# Patient Record
Sex: MALE | Race: BLACK OR AFRICAN AMERICAN | NOT HISPANIC OR LATINO | Employment: FULL TIME | URBAN - METROPOLITAN AREA
[De-identification: names, ages, dates, MRNs, and addresses within clinical notes are randomized per-mention and may not be internally consistent; named-entity substitution may affect disease eponyms.]

---

## 2018-10-24 ENCOUNTER — OCCUPATIONAL HEALTH (OUTPATIENT)
Dept: URGENT CARE | Facility: CLINIC | Age: 48
End: 2018-10-24

## 2018-10-24 PROCEDURE — 99499 UNLISTED E&M SERVICE: CPT | Mod: S$GLB,,, | Performed by: EMERGENCY MEDICINE

## 2019-09-11 ENCOUNTER — OCCUPATIONAL HEALTH (OUTPATIENT)
Dept: URGENT CARE | Facility: CLINIC | Age: 49
End: 2019-09-11

## 2019-09-11 DIAGNOSIS — Z02.83 ENCOUNTER FOR EMPLOYMENT-RELATED DRUG TESTING: ICD-10-CM

## 2019-09-11 PROCEDURE — 80305 DRUG TEST PRSMV DIR OPT OBS: CPT | Mod: S$GLB,,, | Performed by: EMERGENCY MEDICINE

## 2019-09-11 PROCEDURE — 82075 ASSAY OF BREATH ETHANOL: CPT | Mod: S$GLB,,, | Performed by: EMERGENCY MEDICINE

## 2019-09-11 PROCEDURE — 80305 PR COLLECTION ONLY DRUG SCREEN: ICD-10-PCS | Mod: S$GLB,,, | Performed by: EMERGENCY MEDICINE

## 2019-09-11 PROCEDURE — 82075 CHG ASSAY OF BREATH ETHANOL: ICD-10-PCS | Mod: S$GLB,,, | Performed by: EMERGENCY MEDICINE

## 2020-04-22 PROBLEM — J30.9 ALLERGIC RHINITIS: Status: ACTIVE | Noted: 2020-04-22

## 2020-07-28 DIAGNOSIS — R31.0 GROSS HEMATURIA: Primary | ICD-10-CM

## 2020-07-29 DIAGNOSIS — R31.0 GROSS HEMATURIA: Primary | ICD-10-CM

## 2020-08-03 ENCOUNTER — HOSPITAL ENCOUNTER (OUTPATIENT)
Dept: RADIOLOGY | Facility: HOSPITAL | Age: 50
Discharge: HOME | End: 2020-08-03
Attending: SPECIALIST
Payer: COMMERCIAL

## 2020-08-03 DIAGNOSIS — R31.0 GROSS HEMATURIA: ICD-10-CM

## 2020-08-03 PROCEDURE — 25500020 PHARM REV CODE 255: Mod: PO | Performed by: SPECIALIST

## 2020-08-03 PROCEDURE — 74178 CT ABD&PLV WO CNTR FLWD CNTR: CPT | Mod: TC,PO

## 2020-08-03 RX ADMIN — IOHEXOL 100 ML: 350 INJECTION, SOLUTION INTRAVENOUS at 02:08

## 2020-10-19 ENCOUNTER — OCCUPATIONAL HEALTH (OUTPATIENT)
Dept: URGENT CARE | Facility: CLINIC | Age: 50
End: 2020-10-19

## 2020-10-19 PROCEDURE — 99499 PR PHYSICAL - DOT/CDL: ICD-10-PCS | Mod: S$GLB,,, | Performed by: NURSE PRACTITIONER

## 2020-10-19 PROCEDURE — 99499 UNLISTED E&M SERVICE: CPT | Mod: S$GLB,,, | Performed by: NURSE PRACTITIONER

## 2021-02-25 ENCOUNTER — OCCUPATIONAL HEALTH (OUTPATIENT)
Dept: URGENT CARE | Facility: CLINIC | Age: 51
End: 2021-02-25
Payer: COMMERCIAL

## 2021-02-25 PROCEDURE — 80305 DRUG TEST PRSMV DIR OPT OBS: CPT | Mod: S$GLB,,, | Performed by: EMERGENCY MEDICINE

## 2021-02-25 PROCEDURE — 80305 PR COLLECTION ONLY DRUG SCREEN: ICD-10-PCS | Mod: S$GLB,,, | Performed by: EMERGENCY MEDICINE

## 2021-03-31 ENCOUNTER — OCCUPATIONAL HEALTH (OUTPATIENT)
Dept: URGENT CARE | Facility: CLINIC | Age: 51
End: 2021-03-31
Payer: COMMERCIAL

## 2021-03-31 PROCEDURE — 80305 PR COLLECTION ONLY DRUG SCREEN: ICD-10-PCS | Mod: S$GLB,,, | Performed by: EMERGENCY MEDICINE

## 2021-03-31 PROCEDURE — 82075 ASSAY OF BREATH ETHANOL: CPT | Mod: S$GLB,,, | Performed by: EMERGENCY MEDICINE

## 2021-03-31 PROCEDURE — 80305 DRUG TEST PRSMV DIR OPT OBS: CPT | Mod: S$GLB,,, | Performed by: EMERGENCY MEDICINE

## 2021-03-31 PROCEDURE — 82075 CHG ASSAY OF BREATH ETHANOL: ICD-10-PCS | Mod: S$GLB,,, | Performed by: EMERGENCY MEDICINE

## 2021-07-08 DIAGNOSIS — Z00.00 ROUTINE GENERAL MEDICAL EXAMINATION AT A HEALTH CARE FACILITY: Primary | ICD-10-CM

## 2021-07-08 DIAGNOSIS — Z12.5 SCREENING FOR PROSTATE CANCER: ICD-10-CM

## 2021-08-13 ENCOUNTER — TELEPHONE (OUTPATIENT)
Dept: FAMILY MEDICINE | Facility: CLINIC | Age: 51
End: 2021-08-13

## 2021-08-23 ENCOUNTER — OFFICE VISIT (OUTPATIENT)
Dept: FAMILY MEDICINE | Facility: CLINIC | Age: 51
End: 2021-08-23
Payer: COMMERCIAL

## 2021-08-23 VITALS
TEMPERATURE: 98 F | WEIGHT: 263 LBS | HEART RATE: 64 BPM | DIASTOLIC BLOOD PRESSURE: 84 MMHG | HEIGHT: 74 IN | OXYGEN SATURATION: 98 % | BODY MASS INDEX: 33.75 KG/M2 | SYSTOLIC BLOOD PRESSURE: 134 MMHG

## 2021-08-23 DIAGNOSIS — Z00.00 PHYSICAL EXAM: Primary | ICD-10-CM

## 2021-08-23 DIAGNOSIS — D49.2 NEOPLASM OF SKIN OF LOWER LEG: ICD-10-CM

## 2021-08-23 PROCEDURE — 1160F PR REVIEW ALL MEDS BY PRESCRIBER/CLIN PHARMACIST DOCUMENTED: ICD-10-PCS | Mod: CPTII,S$GLB,, | Performed by: FAMILY MEDICINE

## 2021-08-23 PROCEDURE — 90472 ZOSTER RECOMBINANT VACCINE: ICD-10-PCS | Mod: S$GLB,,, | Performed by: FAMILY MEDICINE

## 2021-08-23 PROCEDURE — 1159F MED LIST DOCD IN RCRD: CPT | Mod: CPTII,S$GLB,, | Performed by: FAMILY MEDICINE

## 2021-08-23 PROCEDURE — 99386 PR PREVENTIVE VISIT,NEW,40-64: ICD-10-PCS | Mod: 25,S$GLB,, | Performed by: FAMILY MEDICINE

## 2021-08-23 PROCEDURE — 3008F BODY MASS INDEX DOCD: CPT | Mod: CPTII,S$GLB,, | Performed by: FAMILY MEDICINE

## 2021-08-23 PROCEDURE — 90471 IMMUNIZATION ADMIN: CPT | Mod: S$GLB,,, | Performed by: FAMILY MEDICINE

## 2021-08-23 PROCEDURE — 90750 ZOSTER RECOMBINANT VACCINE: ICD-10-PCS | Mod: S$GLB,,, | Performed by: FAMILY MEDICINE

## 2021-08-23 PROCEDURE — 3075F PR MOST RECENT SYSTOLIC BLOOD PRESS GE 130-139MM HG: ICD-10-PCS | Mod: CPTII,S$GLB,, | Performed by: FAMILY MEDICINE

## 2021-08-23 PROCEDURE — 90750 HZV VACC RECOMBINANT IM: CPT | Mod: S$GLB,,, | Performed by: FAMILY MEDICINE

## 2021-08-23 PROCEDURE — 90715 TDAP VACCINE GREATER THAN OR EQUAL TO 7YO IM: ICD-10-PCS | Mod: S$GLB,,, | Performed by: FAMILY MEDICINE

## 2021-08-23 PROCEDURE — 90472 IMMUNIZATION ADMIN EACH ADD: CPT | Mod: S$GLB,,, | Performed by: FAMILY MEDICINE

## 2021-08-23 PROCEDURE — 3075F SYST BP GE 130 - 139MM HG: CPT | Mod: CPTII,S$GLB,, | Performed by: FAMILY MEDICINE

## 2021-08-23 PROCEDURE — 1159F PR MEDICATION LIST DOCUMENTED IN MEDICAL RECORD: ICD-10-PCS | Mod: CPTII,S$GLB,, | Performed by: FAMILY MEDICINE

## 2021-08-23 PROCEDURE — 90471 TDAP VACCINE GREATER THAN OR EQUAL TO 7YO IM: ICD-10-PCS | Mod: S$GLB,,, | Performed by: FAMILY MEDICINE

## 2021-08-23 PROCEDURE — 3008F PR BODY MASS INDEX (BMI) DOCUMENTED: ICD-10-PCS | Mod: CPTII,S$GLB,, | Performed by: FAMILY MEDICINE

## 2021-08-23 PROCEDURE — 3079F DIAST BP 80-89 MM HG: CPT | Mod: CPTII,S$GLB,, | Performed by: FAMILY MEDICINE

## 2021-08-23 PROCEDURE — 1160F RVW MEDS BY RX/DR IN RCRD: CPT | Mod: CPTII,S$GLB,, | Performed by: FAMILY MEDICINE

## 2021-08-23 PROCEDURE — 3079F PR MOST RECENT DIASTOLIC BLOOD PRESSURE 80-89 MM HG: ICD-10-PCS | Mod: CPTII,S$GLB,, | Performed by: FAMILY MEDICINE

## 2021-08-23 PROCEDURE — 99386 PREV VISIT NEW AGE 40-64: CPT | Mod: 25,S$GLB,, | Performed by: FAMILY MEDICINE

## 2021-08-23 PROCEDURE — 90715 TDAP VACCINE 7 YRS/> IM: CPT | Mod: S$GLB,,, | Performed by: FAMILY MEDICINE

## 2021-08-27 LAB
ALBUMIN SERPL-MCNC: 4.4 G/DL (ref 3.6–5.1)
ALBUMIN/GLOB SERPL: 1.8 (CALC) (ref 1–2.5)
ALP SERPL-CCNC: 48 U/L (ref 35–144)
ALT SERPL-CCNC: 40 U/L (ref 9–46)
AST SERPL-CCNC: 44 U/L (ref 10–35)
BASOPHILS # BLD AUTO: 18 CELLS/UL (ref 0–200)
BASOPHILS NFR BLD AUTO: 0.4 %
BILIRUB SERPL-MCNC: 0.8 MG/DL (ref 0.2–1.2)
BUN SERPL-MCNC: 12 MG/DL (ref 7–25)
BUN/CREAT SERPL: ABNORMAL (CALC) (ref 6–22)
CALCIUM SERPL-MCNC: 9.4 MG/DL (ref 8.6–10.3)
CHLORIDE SERPL-SCNC: 105 MMOL/L (ref 98–110)
CHOLEST SERPL-MCNC: 140 MG/DL
CHOLEST/HDLC SERPL: 2.9 (CALC)
CO2 SERPL-SCNC: 27 MMOL/L (ref 20–32)
CREAT SERPL-MCNC: 0.85 MG/DL (ref 0.7–1.33)
EOSINOPHIL # BLD AUTO: 131 CELLS/UL (ref 15–500)
EOSINOPHIL NFR BLD AUTO: 2.9 %
ERYTHROCYTE [DISTWIDTH] IN BLOOD BY AUTOMATED COUNT: 14.3 % (ref 11–15)
GLOBULIN SER CALC-MCNC: 2.5 G/DL (CALC) (ref 1.9–3.7)
GLUCOSE SERPL-MCNC: 112 MG/DL (ref 65–99)
HCT VFR BLD AUTO: 38.5 % (ref 38.5–50)
HCV AB S/CO SERPL IA: 0.01
HCV AB SERPL QL IA: NORMAL
HDLC SERPL-MCNC: 48 MG/DL
HGB BLD-MCNC: 12.4 G/DL (ref 13.2–17.1)
HIV 1+2 AB+HIV1 P24 AG SERPL QL IA: NORMAL
LDLC SERPL CALC-MCNC: 77 MG/DL (CALC)
LYMPHOCYTES # BLD AUTO: 1530 CELLS/UL (ref 850–3900)
LYMPHOCYTES NFR BLD AUTO: 34 %
MCH RBC QN AUTO: 24 PG (ref 27–33)
MCHC RBC AUTO-ENTMCNC: 32.2 G/DL (ref 32–36)
MCV RBC AUTO: 74.6 FL (ref 80–100)
MONOCYTES # BLD AUTO: 369 CELLS/UL (ref 200–950)
MONOCYTES NFR BLD AUTO: 8.2 %
NEUTROPHILS # BLD AUTO: 2453 CELLS/UL (ref 1500–7800)
NEUTROPHILS NFR BLD AUTO: 54.5 %
NONHDLC SERPL-MCNC: 92 MG/DL (CALC)
PLATELET # BLD AUTO: 130 THOUSAND/UL (ref 140–400)
PMV BLD REES-ECKER: 12.7 FL (ref 7.5–12.5)
POTASSIUM SERPL-SCNC: 4 MMOL/L (ref 3.5–5.3)
PROT SERPL-MCNC: 6.9 G/DL (ref 6.1–8.1)
RBC # BLD AUTO: 5.16 MILLION/UL (ref 4.2–5.8)
SODIUM SERPL-SCNC: 139 MMOL/L (ref 135–146)
TRIGL SERPL-MCNC: 69 MG/DL
TSH SERPL-ACNC: 2.77 MIU/L (ref 0.4–4.5)
WBC # BLD AUTO: 4.5 THOUSAND/UL (ref 3.8–10.8)

## 2021-09-08 ENCOUNTER — TELEPHONE (OUTPATIENT)
Dept: FAMILY MEDICINE | Facility: CLINIC | Age: 51
End: 2021-09-08

## 2021-09-11 PROBLEM — D49.2 NEOPLASM OF SKIN OF LOWER LEG: Status: ACTIVE | Noted: 2021-09-11

## 2021-09-29 ENCOUNTER — OFFICE VISIT (OUTPATIENT)
Dept: DERMATOLOGY | Facility: CLINIC | Age: 51
End: 2021-09-29
Payer: COMMERCIAL

## 2021-09-29 DIAGNOSIS — L43.9 LICHEN PLANUS: ICD-10-CM

## 2021-09-29 PROCEDURE — 1160F RVW MEDS BY RX/DR IN RCRD: CPT | Mod: CPTII,S$GLB,, | Performed by: STUDENT IN AN ORGANIZED HEALTH CARE EDUCATION/TRAINING PROGRAM

## 2021-09-29 PROCEDURE — 99999 PR PBB SHADOW E&M-EST. PATIENT-LVL II: CPT | Mod: PBBFAC,,, | Performed by: STUDENT IN AN ORGANIZED HEALTH CARE EDUCATION/TRAINING PROGRAM

## 2021-09-29 PROCEDURE — 99203 OFFICE O/P NEW LOW 30 MIN: CPT | Mod: 25,S$GLB,, | Performed by: STUDENT IN AN ORGANIZED HEALTH CARE EDUCATION/TRAINING PROGRAM

## 2021-09-29 PROCEDURE — 11900 INJECT SKIN LESIONS </W 7: CPT | Mod: S$GLB,,, | Performed by: STUDENT IN AN ORGANIZED HEALTH CARE EDUCATION/TRAINING PROGRAM

## 2021-09-29 PROCEDURE — 1160F PR REVIEW ALL MEDS BY PRESCRIBER/CLIN PHARMACIST DOCUMENTED: ICD-10-PCS | Mod: CPTII,S$GLB,, | Performed by: STUDENT IN AN ORGANIZED HEALTH CARE EDUCATION/TRAINING PROGRAM

## 2021-09-29 PROCEDURE — 1159F MED LIST DOCD IN RCRD: CPT | Mod: CPTII,S$GLB,, | Performed by: STUDENT IN AN ORGANIZED HEALTH CARE EDUCATION/TRAINING PROGRAM

## 2021-09-29 PROCEDURE — 11900 PR INJECTION INTO SKIN LESIONS, UP TO 7: ICD-10-PCS | Mod: S$GLB,,, | Performed by: STUDENT IN AN ORGANIZED HEALTH CARE EDUCATION/TRAINING PROGRAM

## 2021-09-29 PROCEDURE — 1159F PR MEDICATION LIST DOCUMENTED IN MEDICAL RECORD: ICD-10-PCS | Mod: CPTII,S$GLB,, | Performed by: STUDENT IN AN ORGANIZED HEALTH CARE EDUCATION/TRAINING PROGRAM

## 2021-09-29 PROCEDURE — 99999 PR PBB SHADOW E&M-EST. PATIENT-LVL II: ICD-10-PCS | Mod: PBBFAC,,, | Performed by: STUDENT IN AN ORGANIZED HEALTH CARE EDUCATION/TRAINING PROGRAM

## 2021-09-29 PROCEDURE — 99203 PR OFFICE/OUTPT VISIT, NEW, LEVL III, 30-44 MIN: ICD-10-PCS | Mod: 25,S$GLB,, | Performed by: STUDENT IN AN ORGANIZED HEALTH CARE EDUCATION/TRAINING PROGRAM

## 2021-09-29 RX ORDER — CLOBETASOL PROPIONATE 0.5 MG/G
OINTMENT TOPICAL 2 TIMES DAILY
Qty: 60 G | Refills: 2 | Status: SHIPPED | OUTPATIENT
Start: 2021-09-29 | End: 2022-07-14 | Stop reason: SDUPTHER

## 2022-06-10 ENCOUNTER — TELEPHONE (OUTPATIENT)
Dept: FAMILY MEDICINE | Facility: CLINIC | Age: 52
End: 2022-06-10
Payer: COMMERCIAL

## 2022-06-10 DIAGNOSIS — Z00.00 PHYSICAL EXAM: Primary | ICD-10-CM

## 2022-06-10 NOTE — TELEPHONE ENCOUNTER
----- Message from Latha Clemente sent at 6/10/2022 12:51 PM CDT -----  Contact: pt  Type: Needs Lab Orders     Who Called: pt  Best Call Back Number: [phone]    Inquiry/Question: pt has appt for July 14, 2022 and would like his orders put in so he can get them done on the 12th or 13th. 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 Please call pt when done    .  Please place order in system.     Thank you~         DONE

## 2022-07-12 LAB
ALBUMIN SERPL-MCNC: 4.6 G/DL (ref 3.6–5.1)
ALBUMIN/GLOB SERPL: 1.9 (CALC) (ref 1–2.5)
ALP SERPL-CCNC: 50 U/L (ref 35–144)
ALT SERPL-CCNC: 48 U/L (ref 9–46)
AST SERPL-CCNC: 49 U/L (ref 10–35)
BASOPHILS # BLD AUTO: 8 CELLS/UL (ref 0–200)
BASOPHILS NFR BLD AUTO: 0.2 %
BILIRUB SERPL-MCNC: 0.7 MG/DL (ref 0.2–1.2)
BUN SERPL-MCNC: 13 MG/DL (ref 7–25)
BUN/CREAT SERPL: ABNORMAL (CALC) (ref 6–22)
CALCIUM SERPL-MCNC: 9.4 MG/DL (ref 8.6–10.3)
CHLORIDE SERPL-SCNC: 108 MMOL/L (ref 98–110)
CHOLEST SERPL-MCNC: 138 MG/DL
CHOLEST/HDLC SERPL: 2.7 (CALC)
CO2 SERPL-SCNC: 27 MMOL/L (ref 20–32)
CREAT SERPL-MCNC: 0.9 MG/DL (ref 0.7–1.3)
EGFR: 102 ML/MIN/1.73M2
EOSINOPHIL # BLD AUTO: 111 CELLS/UL (ref 15–500)
EOSINOPHIL NFR BLD AUTO: 2.7 %
ERYTHROCYTE [DISTWIDTH] IN BLOOD BY AUTOMATED COUNT: 14.5 % (ref 11–15)
GLOBULIN SER CALC-MCNC: 2.4 G/DL (CALC) (ref 1.9–3.7)
GLUCOSE SERPL-MCNC: 96 MG/DL (ref 65–99)
HCT VFR BLD AUTO: 41.7 % (ref 38.5–50)
HDLC SERPL-MCNC: 51 MG/DL
HGB BLD-MCNC: 12.9 G/DL (ref 13.2–17.1)
LDLC SERPL CALC-MCNC: 73 MG/DL (CALC)
LYMPHOCYTES # BLD AUTO: 1677 CELLS/UL (ref 850–3900)
LYMPHOCYTES NFR BLD AUTO: 40.9 %
MCH RBC QN AUTO: 23.7 PG (ref 27–33)
MCHC RBC AUTO-ENTMCNC: 30.9 G/DL (ref 32–36)
MCV RBC AUTO: 76.7 FL (ref 80–100)
MONOCYTES # BLD AUTO: 262 CELLS/UL (ref 200–950)
MONOCYTES NFR BLD AUTO: 6.4 %
NEUTROPHILS # BLD AUTO: 2042 CELLS/UL (ref 1500–7800)
NEUTROPHILS NFR BLD AUTO: 49.8 %
NONHDLC SERPL-MCNC: 87 MG/DL (CALC)
PLATELET # BLD AUTO: 145 THOUSAND/UL (ref 140–400)
PMV BLD REES-ECKER: 12.5 FL (ref 7.5–12.5)
POTASSIUM SERPL-SCNC: 4.2 MMOL/L (ref 3.5–5.3)
PROT SERPL-MCNC: 7 G/DL (ref 6.1–8.1)
PSA SERPL-MCNC: 0.22 NG/ML
RBC # BLD AUTO: 5.44 MILLION/UL (ref 4.2–5.8)
SODIUM SERPL-SCNC: 143 MMOL/L (ref 135–146)
TRIGL SERPL-MCNC: 62 MG/DL
WBC # BLD AUTO: 4.1 THOUSAND/UL (ref 3.8–10.8)

## 2022-07-14 ENCOUNTER — OFFICE VISIT (OUTPATIENT)
Dept: FAMILY MEDICINE | Facility: CLINIC | Age: 52
End: 2022-07-14
Payer: COMMERCIAL

## 2022-07-14 ENCOUNTER — PATIENT OUTREACH (OUTPATIENT)
Dept: ADMINISTRATIVE | Facility: HOSPITAL | Age: 52
End: 2022-07-14
Payer: COMMERCIAL

## 2022-07-14 VITALS
OXYGEN SATURATION: 95 % | HEIGHT: 74 IN | HEART RATE: 67 BPM | SYSTOLIC BLOOD PRESSURE: 126 MMHG | WEIGHT: 264.88 LBS | TEMPERATURE: 98 F | DIASTOLIC BLOOD PRESSURE: 84 MMHG | BODY MASS INDEX: 33.99 KG/M2

## 2022-07-14 DIAGNOSIS — J30.9 ALLERGIC RHINITIS, UNSPECIFIED SEASONALITY, UNSPECIFIED TRIGGER: ICD-10-CM

## 2022-07-14 DIAGNOSIS — Z00.00 PHYSICAL EXAM: Primary | ICD-10-CM

## 2022-07-14 DIAGNOSIS — L43.9 LICHEN PLANUS: ICD-10-CM

## 2022-07-14 PROCEDURE — 90471 ZOSTER RECOMBINANT VACCINE: ICD-10-PCS | Mod: S$GLB,,, | Performed by: FAMILY MEDICINE

## 2022-07-14 PROCEDURE — 99396 PREV VISIT EST AGE 40-64: CPT | Mod: 25,S$GLB,, | Performed by: FAMILY MEDICINE

## 2022-07-14 PROCEDURE — 3074F PR MOST RECENT SYSTOLIC BLOOD PRESSURE < 130 MM HG: ICD-10-PCS | Mod: CPTII,S$GLB,, | Performed by: FAMILY MEDICINE

## 2022-07-14 PROCEDURE — 3079F DIAST BP 80-89 MM HG: CPT | Mod: CPTII,S$GLB,, | Performed by: FAMILY MEDICINE

## 2022-07-14 PROCEDURE — 99396 PR PREVENTIVE VISIT,EST,40-64: ICD-10-PCS | Mod: 25,S$GLB,, | Performed by: FAMILY MEDICINE

## 2022-07-14 PROCEDURE — 90750 HZV VACC RECOMBINANT IM: CPT | Mod: S$GLB,,, | Performed by: FAMILY MEDICINE

## 2022-07-14 PROCEDURE — 1159F PR MEDICATION LIST DOCUMENTED IN MEDICAL RECORD: ICD-10-PCS | Mod: CPTII,S$GLB,, | Performed by: FAMILY MEDICINE

## 2022-07-14 PROCEDURE — 1160F RVW MEDS BY RX/DR IN RCRD: CPT | Mod: CPTII,S$GLB,, | Performed by: FAMILY MEDICINE

## 2022-07-14 PROCEDURE — 1160F PR REVIEW ALL MEDS BY PRESCRIBER/CLIN PHARMACIST DOCUMENTED: ICD-10-PCS | Mod: CPTII,S$GLB,, | Performed by: FAMILY MEDICINE

## 2022-07-14 PROCEDURE — 3074F SYST BP LT 130 MM HG: CPT | Mod: CPTII,S$GLB,, | Performed by: FAMILY MEDICINE

## 2022-07-14 PROCEDURE — 1159F MED LIST DOCD IN RCRD: CPT | Mod: CPTII,S$GLB,, | Performed by: FAMILY MEDICINE

## 2022-07-14 PROCEDURE — 90471 IMMUNIZATION ADMIN: CPT | Mod: S$GLB,,, | Performed by: FAMILY MEDICINE

## 2022-07-14 PROCEDURE — 3008F BODY MASS INDEX DOCD: CPT | Mod: CPTII,S$GLB,, | Performed by: FAMILY MEDICINE

## 2022-07-14 PROCEDURE — 90750 ZOSTER RECOMBINANT VACCINE: ICD-10-PCS | Mod: S$GLB,,, | Performed by: FAMILY MEDICINE

## 2022-07-14 PROCEDURE — 3079F PR MOST RECENT DIASTOLIC BLOOD PRESSURE 80-89 MM HG: ICD-10-PCS | Mod: CPTII,S$GLB,, | Performed by: FAMILY MEDICINE

## 2022-07-14 PROCEDURE — 3008F PR BODY MASS INDEX (BMI) DOCUMENTED: ICD-10-PCS | Mod: CPTII,S$GLB,, | Performed by: FAMILY MEDICINE

## 2022-07-14 RX ORDER — CLOBETASOL PROPIONATE 0.5 MG/G
OINTMENT TOPICAL 2 TIMES DAILY
Qty: 60 G | Refills: 2 | Status: SHIPPED | OUTPATIENT
Start: 2022-07-14

## 2022-07-14 RX ORDER — IBUPROFEN 100 MG/5ML
1000 SUSPENSION, ORAL (FINAL DOSE FORM) ORAL DAILY
COMMUNITY

## 2022-07-14 RX ORDER — CYCLOBENZAPRINE HCL 10 MG
10 TABLET ORAL EVERY 8 HOURS
COMMUNITY
Start: 2022-02-15

## 2022-07-14 NOTE — LETTER
AUTHORIZATION FOR RELEASE OF   CONFIDENTIAL INFORMATION    DR PARTIDA    We are seeing Delfin Mohr, date of birth 1970, in the clinic at [facility]. Dinh Urias III, MD is the patient's PCP. Delfin Mohr has an outstanding lab/procedure at the time we reviewed his chart. In order to help keep his health information updated, he has authorized us to request the following medical record(s):        (  )  MAMMOGRAM                                      ( X  COLONOSCOPY      (  )  PAP SMEAR                                          (  )  OUTSIDE LAB RESULTS     (  )  DEXA SCAN                                          (  )  EYE EXAM            (  )  FOOT EXAM                                          (  )  ENTIRE RECORD     (  )  OUTSIDE IMMUNIZATIONS                 (  )  _______________         Please fax records to [facility], Clinton H Sharp III, MD, [phone]    Thank you in advance,        Mila ZHONG  Care Coordinator  [facility] [facility] [address] [place] [address]  Phone [phone]  Fax [phone]          Patient Name: Delfin Mohr  : 1970  Patient Phone #: [phone]

## 2022-07-14 NOTE — PROGRESS NOTES
Subjective:       Patient ID: Delfin Mohr is a 52 y.o. male.    Chief Complaint: Annual Exam and Rash (On leg and pain)    HPI   Patient presents to clinic for annual exam. Rash 1-1 1/2 years. Lichen planus on lower extremities. They are not painful. Some itchiness. Went to dermatologist. Was given an injection in office. Using Temovate cream. Needs refill on this. Still . No tobacco or alcohol use. No exercise. No changes in family history. No recent surgeries. Had lab work drawn. Will call for results. Previous lab work was very good. Started taking Vit C and B12 OTC. Some sinus issues. Was going to have surgery but declined at the time due to high deductible. No wheezing. Due for second shingles shot. Colonoscopy up to date. Has follow up next month. Neoplasm of uncertain behavior of bladder. No trouble urinating. Some nocturia. States his urine is dark at times. Current follow up with urologist.   Review of Systems   Constitutional: Negative.    HENT: Negative.    Eyes: Negative.    Respiratory: Negative.    Cardiovascular: Negative.  Negative for chest pain and palpitations.   Gastrointestinal: Negative.    Endocrine: Negative.    Genitourinary:        Nocturia   Musculoskeletal: Negative.    Integumentary:  Positive for rash.        Lichen planus    Allergic/Immunologic: Positive for environmental allergies.   Neurological: Negative.    Hematological: Negative.    Psychiatric/Behavioral: Negative.          Objective:      Physical Exam  Constitutional:       Appearance: Normal appearance.   Neck:      Vascular: No carotid bruit.   Cardiovascular:      Rate and Rhythm: Normal rate and regular rhythm.      Pulses: Normal pulses.      Heart sounds: Normal heart sounds.   Pulmonary:      Effort: Pulmonary effort is normal.      Breath sounds: Normal breath sounds.   Abdominal:      General: Abdomen is flat.      Palpations: Abdomen is soft.   Musculoskeletal:         General: Normal range of  motion.   Lymphadenopathy:      Cervical: No cervical adenopathy.   Skin:     General: Skin is warm and dry.   Neurological:      Mental Status: He is alert.   Psychiatric:         Mood and Affect: Mood normal.         Behavior: Behavior normal.         Assessment/Plan:     Physical exam    Lichen planus  -     clobetasol 0.05% (TEMOVATE) 0.05 % Oint; Apply topically 2 (two) times daily.  Dispense: 60 g; Refill: 2    Allergic rhinitis, unspecified seasonality, unspecified trigger    Other orders  -     Zoster Recombinant Vaccine       Second rules vaccine dose.  Get lab results from [facility] done 4 days ago somehow not showing in the system.  Had CBC CMP lipids and PSA.  Colonoscopy result from Dr. Bowman.  Need to copy of this refill his Temovate cream.

## 2022-07-19 ENCOUNTER — TELEPHONE (OUTPATIENT)
Dept: FAMILY MEDICINE | Facility: CLINIC | Age: 52
End: 2022-07-19
Payer: COMMERCIAL

## 2022-07-19 NOTE — TELEPHONE ENCOUNTER
----- Message from Marshall Ramesh sent at 7/19/2022 11:43 AM CDT -----  Type: Needs Medical Advice  Who Called:  Pt    Best Call Back Number: [phone] )     Additional Information: Pt is confused on his lab results and would like a call back from someone to explain them to him.  Please advise- - Thank you

## 2022-10-07 ENCOUNTER — OCCUPATIONAL HEALTH (OUTPATIENT)
Dept: URGENT CARE | Facility: CLINIC | Age: 52
End: 2022-10-07

## 2022-10-07 DIAGNOSIS — Z00.00 ENCOUNTER FOR PHYSICAL EXAMINATION: Primary | ICD-10-CM

## 2022-10-07 LAB — COLLECTION ONLY: NORMAL

## 2022-10-07 PROCEDURE — 99499 DOT PHYSICAL: ICD-10-PCS | Mod: S$GLB,,, | Performed by: NURSE PRACTITIONER

## 2022-10-07 PROCEDURE — 99499 UNLISTED E&M SERVICE: CPT | Mod: S$GLB,,, | Performed by: NURSE PRACTITIONER

## 2023-01-26 ENCOUNTER — OCCUPATIONAL HEALTH (OUTPATIENT)
Dept: URGENT CARE | Facility: CLINIC | Age: 53
End: 2023-01-26
Payer: COMMERCIAL

## 2023-01-26 DIAGNOSIS — Z13.9 ENCOUNTER FOR SCREENING: Primary | ICD-10-CM

## 2023-01-26 LAB — COLLECTION ONLY: NORMAL

## 2023-01-26 PROCEDURE — 80305 DRUG TEST PRSMV DIR OPT OBS: CPT | Mod: S$GLB,,, | Performed by: EMERGENCY MEDICINE

## 2023-01-26 PROCEDURE — 80305 OOH COLLECTION ONLY DRUG SCREEN: ICD-10-PCS | Mod: S$GLB,,, | Performed by: EMERGENCY MEDICINE

## 2023-04-10 ENCOUNTER — TELEPHONE (OUTPATIENT)
Dept: FAMILY MEDICINE | Facility: CLINIC | Age: 53
End: 2023-04-10
Payer: COMMERCIAL

## 2023-04-10 ENCOUNTER — OFFICE VISIT (OUTPATIENT)
Dept: FAMILY MEDICINE | Facility: CLINIC | Age: 53
End: 2023-04-10
Payer: COMMERCIAL

## 2023-04-10 VITALS
OXYGEN SATURATION: 100 % | HEIGHT: 74 IN | DIASTOLIC BLOOD PRESSURE: 82 MMHG | BODY MASS INDEX: 32.9 KG/M2 | HEART RATE: 99 BPM | WEIGHT: 256.38 LBS | TEMPERATURE: 99 F | SYSTOLIC BLOOD PRESSURE: 126 MMHG

## 2023-04-10 DIAGNOSIS — Z13.1 SCREENING FOR DIABETES MELLITUS (DM): ICD-10-CM

## 2023-04-10 DIAGNOSIS — L50.9 URTICARIA: Primary | ICD-10-CM

## 2023-04-10 PROCEDURE — 99213 PR OFFICE/OUTPT VISIT, EST, LEVL III, 20-29 MIN: ICD-10-PCS | Mod: S$GLB,,, | Performed by: FAMILY MEDICINE

## 2023-04-10 PROCEDURE — 1159F PR MEDICATION LIST DOCUMENTED IN MEDICAL RECORD: ICD-10-PCS | Mod: CPTII,S$GLB,, | Performed by: FAMILY MEDICINE

## 2023-04-10 PROCEDURE — 99213 OFFICE O/P EST LOW 20 MIN: CPT | Mod: S$GLB,,, | Performed by: FAMILY MEDICINE

## 2023-04-10 PROCEDURE — 1159F MED LIST DOCD IN RCRD: CPT | Mod: CPTII,S$GLB,, | Performed by: FAMILY MEDICINE

## 2023-04-10 PROCEDURE — 3074F PR MOST RECENT SYSTOLIC BLOOD PRESSURE < 130 MM HG: ICD-10-PCS | Mod: CPTII,S$GLB,, | Performed by: FAMILY MEDICINE

## 2023-04-10 PROCEDURE — 1160F PR REVIEW ALL MEDS BY PRESCRIBER/CLIN PHARMACIST DOCUMENTED: ICD-10-PCS | Mod: CPTII,S$GLB,, | Performed by: FAMILY MEDICINE

## 2023-04-10 PROCEDURE — 3079F PR MOST RECENT DIASTOLIC BLOOD PRESSURE 80-89 MM HG: ICD-10-PCS | Mod: CPTII,S$GLB,, | Performed by: FAMILY MEDICINE

## 2023-04-10 PROCEDURE — 3074F SYST BP LT 130 MM HG: CPT | Mod: CPTII,S$GLB,, | Performed by: FAMILY MEDICINE

## 2023-04-10 PROCEDURE — 3008F BODY MASS INDEX DOCD: CPT | Mod: CPTII,S$GLB,, | Performed by: FAMILY MEDICINE

## 2023-04-10 PROCEDURE — 3079F DIAST BP 80-89 MM HG: CPT | Mod: CPTII,S$GLB,, | Performed by: FAMILY MEDICINE

## 2023-04-10 PROCEDURE — 3008F PR BODY MASS INDEX (BMI) DOCUMENTED: ICD-10-PCS | Mod: CPTII,S$GLB,, | Performed by: FAMILY MEDICINE

## 2023-04-10 PROCEDURE — 1160F RVW MEDS BY RX/DR IN RCRD: CPT | Mod: CPTII,S$GLB,, | Performed by: FAMILY MEDICINE

## 2023-04-10 RX ORDER — PREDNISONE 20 MG/1
TABLET ORAL
Qty: 15 TABLET | Refills: 0 | Status: SHIPPED | OUTPATIENT
Start: 2023-04-10

## 2023-04-10 NOTE — TELEPHONE ENCOUNTER
----- Message from Tiera Hall sent at 4/10/2023  8:42 AM CDT -----  Contact: pt  Type:  Same Day Appointment Request    Caller is requesting a same day appointment.  Caller declined first available appointment listed below.      Name of Caller:  patient   When is the first available appointment?    Symptoms:  hives all over his neck back and forearms on and off seems to be getting worse today    Best Call Back Number:  [phone] (home)     Additional Information:   Patient is asking to be seen with you today . Please call back to schedule

## 2023-04-11 NOTE — PROGRESS NOTES
Subjective:       Patient ID: Delfin Mohr is a 53 y.o. male.    Chief Complaint: Urticaria    Three and half weeks ago had his rash on his legs.  Then had sinus surgery.  Had the rash again.  Steroid shot then.  Cut grass 4 days ago and the rash returned again.  Thighs forearms.  History of allergic rhinitis.  No new medication.    Physical examination.  Vital signs noted.  Neck without bruit no adenopathy.  Chest clear.  Heart regular rate and rhythm.  Hives on upper back chest and extremities.      Objective:        Assessment:       1. Urticaria    2. Screening for diabetes mellitus (DM)        Plan:       Urticaria  -     CBC Auto Differential; Future; Expected date: 04/10/2023  -     COMPREHENSIVE METABOLIC PANEL; Future; Expected date: 04/10/2023  -     Ambulatory referral/consult to Allergy; Future; Expected date: 04/17/2023    Screening for diabetes mellitus (DM)  -     Hemoglobin A1C; Future; Expected date: 04/10/2023    Other orders  -     predniSONE (DELTASONE) 20 MG tablet; Take one tablet po bid x 5 days. Then take one tablet po qd x 5 days  Dispense: 15 tablet; Refill: 0      Refer him to Dr. Aimee PULIDO.  A1c CBC CMP ordered.  Zyrtec 10 daily.  Prednisone 20 mg b.i.d. for 5 days then 20 mg daily for 5 days.  Fifteen pills.

## 2023-04-18 DIAGNOSIS — Z00.00 PHYSICAL EXAM: Primary | ICD-10-CM

## 2023-04-19 ENCOUNTER — TELEPHONE (OUTPATIENT)
Dept: FAMILY MEDICINE | Facility: CLINIC | Age: 53
End: 2023-04-19
Payer: COMMERCIAL

## 2023-04-19 LAB
ALBUMIN SERPL-MCNC: 4.1 G/DL (ref 3.6–5.1)
ALBUMIN/GLOB SERPL: 1.8 (CALC) (ref 1–2.5)
ALP SERPL-CCNC: 50 U/L (ref 35–144)
ALT SERPL-CCNC: 18 U/L (ref 9–46)
AST SERPL-CCNC: 15 U/L (ref 10–35)
BASOPHILS # BLD AUTO: 12 CELLS/UL (ref 0–200)
BASOPHILS NFR BLD AUTO: 0.2 %
BILIRUB SERPL-MCNC: 0.3 MG/DL (ref 0.2–1.2)
BUN SERPL-MCNC: 17 MG/DL (ref 7–25)
BUN/CREAT SERPL: ABNORMAL (CALC) (ref 6–22)
CALCIUM SERPL-MCNC: 9.3 MG/DL (ref 8.6–10.3)
CHLORIDE SERPL-SCNC: 104 MMOL/L (ref 98–110)
CO2 SERPL-SCNC: 29 MMOL/L (ref 20–32)
CREAT SERPL-MCNC: 0.83 MG/DL (ref 0.7–1.3)
EGFR: 104 ML/MIN/1.73M2
EOSINOPHIL # BLD AUTO: 68 CELLS/UL (ref 15–500)
EOSINOPHIL NFR BLD AUTO: 1.1 %
ERYTHROCYTE [DISTWIDTH] IN BLOOD BY AUTOMATED COUNT: 14.8 % (ref 11–15)
GLOBULIN SER CALC-MCNC: 2.3 G/DL (CALC) (ref 1.9–3.7)
GLUCOSE SERPL-MCNC: 108 MG/DL (ref 65–99)
HBA1C MFR BLD: 6.2 % OF TOTAL HGB
HCT VFR BLD AUTO: 40.2 % (ref 38.5–50)
HGB BLD-MCNC: 12.6 G/DL (ref 13.2–17.1)
LYMPHOCYTES # BLD AUTO: 1562 CELLS/UL (ref 850–3900)
LYMPHOCYTES NFR BLD AUTO: 25.2 %
MCH RBC QN AUTO: 24 PG (ref 27–33)
MCHC RBC AUTO-ENTMCNC: 31.3 G/DL (ref 32–36)
MCV RBC AUTO: 76.4 FL (ref 80–100)
MONOCYTES # BLD AUTO: 291 CELLS/UL (ref 200–950)
MONOCYTES NFR BLD AUTO: 4.7 %
NEUTROPHILS # BLD AUTO: 4266 CELLS/UL (ref 1500–7800)
NEUTROPHILS NFR BLD AUTO: 68.8 %
PLATELET # BLD AUTO: 171 THOUSAND/UL (ref 140–400)
PMV BLD REES-ECKER: 11.1 FL (ref 7.5–12.5)
POTASSIUM SERPL-SCNC: 4 MMOL/L (ref 3.5–5.3)
PROT SERPL-MCNC: 6.4 G/DL (ref 6.1–8.1)
RBC # BLD AUTO: 5.26 MILLION/UL (ref 4.2–5.8)
SODIUM SERPL-SCNC: 141 MMOL/L (ref 135–146)
WBC # BLD AUTO: 6.2 THOUSAND/UL (ref 3.8–10.8)

## 2023-04-19 NOTE — TELEPHONE ENCOUNTER
----- Message from Arsalan Bolivar sent at 4/19/2023 12:03 PM CDT -----  Type:  Test Results    Who Called:  pt  Name of Test (Lab/Mammo/Etc):  Labs  Date of Test:  4/18  Ordering Provider:  Bridgett  Where the test was performed:  [facility]  Best Call Back Number:  [phone] (home)     Additional Information:  please call and advise--thank you

## 2023-06-20 ENCOUNTER — OFFICE VISIT (OUTPATIENT)
Dept: ALLERGY | Facility: CLINIC | Age: 53
End: 2023-06-20
Payer: COMMERCIAL

## 2023-06-20 VITALS — TEMPERATURE: 99 F | HEART RATE: 81 BPM | SYSTOLIC BLOOD PRESSURE: 128 MMHG | DIASTOLIC BLOOD PRESSURE: 81 MMHG

## 2023-06-20 DIAGNOSIS — L50.8 CHRONIC URTICARIA: Primary | ICD-10-CM

## 2023-06-20 DIAGNOSIS — J33.9 NASAL POLYPOSIS: ICD-10-CM

## 2023-06-20 PROCEDURE — 1160F PR REVIEW ALL MEDS BY PRESCRIBER/CLIN PHARMACIST DOCUMENTED: ICD-10-PCS | Mod: CPTII,S$GLB,, | Performed by: ALLERGY & IMMUNOLOGY

## 2023-06-20 PROCEDURE — 1159F MED LIST DOCD IN RCRD: CPT | Mod: CPTII,S$GLB,, | Performed by: ALLERGY & IMMUNOLOGY

## 2023-06-20 PROCEDURE — 3079F PR MOST RECENT DIASTOLIC BLOOD PRESSURE 80-89 MM HG: ICD-10-PCS | Mod: CPTII,S$GLB,, | Performed by: ALLERGY & IMMUNOLOGY

## 2023-06-20 PROCEDURE — 3074F SYST BP LT 130 MM HG: CPT | Mod: CPTII,S$GLB,, | Performed by: ALLERGY & IMMUNOLOGY

## 2023-06-20 PROCEDURE — 1160F RVW MEDS BY RX/DR IN RCRD: CPT | Mod: CPTII,S$GLB,, | Performed by: ALLERGY & IMMUNOLOGY

## 2023-06-20 PROCEDURE — 3044F HG A1C LEVEL LT 7.0%: CPT | Mod: CPTII,S$GLB,, | Performed by: ALLERGY & IMMUNOLOGY

## 2023-06-20 PROCEDURE — 3074F PR MOST RECENT SYSTOLIC BLOOD PRESSURE < 130 MM HG: ICD-10-PCS | Mod: CPTII,S$GLB,, | Performed by: ALLERGY & IMMUNOLOGY

## 2023-06-20 PROCEDURE — 1159F PR MEDICATION LIST DOCUMENTED IN MEDICAL RECORD: ICD-10-PCS | Mod: CPTII,S$GLB,, | Performed by: ALLERGY & IMMUNOLOGY

## 2023-06-20 PROCEDURE — 3044F PR MOST RECENT HEMOGLOBIN A1C LEVEL <7.0%: ICD-10-PCS | Mod: CPTII,S$GLB,, | Performed by: ALLERGY & IMMUNOLOGY

## 2023-06-20 PROCEDURE — 99204 PR OFFICE/OUTPT VISIT, NEW, LEVL IV, 45-59 MIN: ICD-10-PCS | Mod: S$GLB,,, | Performed by: ALLERGY & IMMUNOLOGY

## 2023-06-20 PROCEDURE — 3079F DIAST BP 80-89 MM HG: CPT | Mod: CPTII,S$GLB,, | Performed by: ALLERGY & IMMUNOLOGY

## 2023-06-20 PROCEDURE — 99204 OFFICE O/P NEW MOD 45 MIN: CPT | Mod: S$GLB,,, | Performed by: ALLERGY & IMMUNOLOGY

## 2023-06-20 RX ORDER — TIMOTHY GRASS POLLEN ALLERGEN EXTRACT 2800 [BAU]/1
1 TABLET SUBLINGUAL DAILY
COMMUNITY
End: 2023-09-14

## 2023-06-20 RX ORDER — FLUTICASONE PROPIONATE 93 UG/1
93 SPRAY, METERED NASAL 2 TIMES DAILY
Qty: 16 ML | Refills: 3 | Status: SHIPPED | OUTPATIENT
Start: 2023-06-20 | End: 2023-09-14 | Stop reason: SDUPTHER

## 2023-06-20 RX ORDER — EPINEPHRINE 0.3 MG/.3ML
INJECTION SUBCUTANEOUS ONCE
COMMUNITY

## 2023-06-20 RX ORDER — FLUTICASONE PROPIONATE 93 UG/1
93 SPRAY, METERED NASAL 2 TIMES DAILY
COMMUNITY
End: 2023-06-20 | Stop reason: SDUPTHER

## 2023-06-20 RX ORDER — CETIRIZINE HYDROCHLORIDE 10 MG/1
10 TABLET ORAL 2 TIMES DAILY
Qty: 60 TABLET | Refills: 3 | Status: SHIPPED | OUTPATIENT
Start: 2023-06-20 | End: 2023-09-14 | Stop reason: SDUPTHER

## 2023-06-20 RX ORDER — MINERAL OIL
180 ENEMA (ML) RECTAL 2 TIMES DAILY
Qty: 60 TABLET | Refills: 3 | Status: SHIPPED | OUTPATIENT
Start: 2023-06-20 | End: 2023-09-14 | Stop reason: SDUPTHER

## 2023-06-20 RX ORDER — CETIRIZINE HYDROCHLORIDE 10 MG/1
10 TABLET ORAL
COMMUNITY
Start: 2023-04-10 | End: 2023-09-14 | Stop reason: SDUPTHER

## 2023-06-20 NOTE — PROGRESS NOTES
Subjective:       Patient ID: Delfin Mohr is a 53 y.o. male.    Chief Complaint: Urticaria (Patient has been experiencing urticaria since March. Currently on sublingual tablets for allergies ( grastek and odactra) zyrtec daily, xhance and epi as needed. )    HPI    March 2023   Started with hives  Cetiriine helps  Nichoer has thyrodi disease  No other medical history other tahn pre  dm  A1c 6.2     Rhinitis  Xhance for nasal polyps  Has had sgy   On slit dust and bc grass   Doesn't feel helpful         Review of Systems    General: neg unexpected weight changes, fevers, chills, night sweats, malaise  HEENT: see hpi, Neg eye pain, vision changes, ear drainage, nose bleeds, throat tightness, sores in the mouth  CV: Neg chest pain, palpitations, swelling  Resp: see hpi, neg shortness of breath, hemoptysis, cough  GI: see hpi, neg dysphagia, night abdominal pain, reflux, chronic diarrhea, chronic constipation  Derm: See Hpi, neg new rash, neg flushing  Mu/sk: Neg joint pain, joint swelling   Psych: Neg anxiety  neuro: neg chronic headaches, muscle weakness  Endo: neg heat/cold intolerance, chronic fatigue    Objective:     Vitals:    06/20/23 1508   BP: 128/81   Pulse: 81   Temp: 98.8 °F (37.1 °C)        Physical Exam    General: no acute distress, well developed well nourished   skin: left forearm urticarial lesions    Assessment:       1. Chronic urticaria    2. Nasal polyposis        Plan:       Chronic urticaria  -     TSH; Future; Expected date: 06/20/2023  -     Protein Electrophoresis, Serum; Future; Expected date: 06/20/2023  -     C-reactive protein; Future; Expected date: 06/20/2023  -     fexofenadine (ALLEGRA) 180 MG tablet; Take 1 tablet (180 mg total) by mouth 2 (two) times a day.  Dispense: 60 tablet; Refill: 3  -     cetirizine (ZYRTEC) 10 MG tablet; Take 1 tablet (10 mg total) by mouth 2 (two) times a day.  Dispense: 60 tablet; Refill: 3    Nasal polyposis  -     fluticasone propionate  (XHANCE) 93 mcg/actuation AerB; 93 mcg by Nasal route 2 (two) times daily.  Dispense: 16 mL; Refill: 3              Chronic urticaria  6/23:  Increase zyrtec   1-2 bid   Pepcid 1 bid prn     Nasal polyps  6/23:  Continue xhance  Consider dupixent q 2 weeks     Follow up in 6 weeks      Cande Foss M.D.  Allergy/Immunology  [facility]   [phone] phone  [phone] fax

## 2023-06-20 NOTE — PATIENT INSTRUCTIONS
Allegra is the least sedative   Consider 1-2 tablets in the morning     Zyrtec 1-2 tablets at night.     [facility] non fasting     Ok to stop the dust mite and grass sublingual tablet.     Follow up in 6 weeks, sooner if needed

## 2023-06-20 NOTE — LETTER
June 20, 2023        Dinh Urias III, MD  [address]  [address]  [place] [address]             [facility] - Allergy  [address]  [address]  [place] [address]  Phone: [phone]  Fax: [phone]   Patient: Delfin Mohr   MR Number: [id number]   YOB: 1970   Date of Visit: 6/20/2023       Dear Dr. Urias:    Thank you for referring Delfin Mohr to me for evaluation. Below are the relevant portions of my assessment and plan of care.            If you have questions, please do not hesitate to call me. I look forward to following Delfin along with you.    Sincerely,      Cande Foss MD           CC  No Recipients

## 2023-06-23 LAB
ALBUMIN SERPL ELPH-MCNC: 4.1 G/DL (ref 3.8–4.8)
ALPHA1 GLOB SERPL ELPH-MCNC: 0.3 G/DL (ref 0.2–0.3)
ALPHA2 GLOB SERPL ELPH-MCNC: 0.6 G/DL (ref 0.5–0.9)
BETA1 GLOB SERPL ELPH-MCNC: 0.5 G/DL (ref 0.4–0.6)
BETA2 GLOB SERPL ELPH-MCNC: 0.4 G/DL (ref 0.2–0.5)
CRP SERPL-MCNC: 2.1 MG/L
GAMMA GLOB SERPL ELPH-MCNC: 0.8 G/DL (ref 0.8–1.7)
PROT PATTERN SERPL ELPH-IMP: NORMAL
PROT SERPL-MCNC: 6.6 G/DL (ref 6.1–8.1)
TSH SERPL-ACNC: 1.68 MIU/L (ref 0.4–4.5)

## 2023-09-14 ENCOUNTER — OFFICE VISIT (OUTPATIENT)
Dept: ALLERGY | Facility: CLINIC | Age: 53
End: 2023-09-14
Payer: COMMERCIAL

## 2023-09-14 VITALS — HEART RATE: 87 BPM | SYSTOLIC BLOOD PRESSURE: 125 MMHG | DIASTOLIC BLOOD PRESSURE: 85 MMHG | TEMPERATURE: 98 F

## 2023-09-14 DIAGNOSIS — L50.8 CHRONIC URTICARIA: ICD-10-CM

## 2023-09-14 DIAGNOSIS — J33.9 NASAL POLYPOSIS: ICD-10-CM

## 2023-09-14 PROCEDURE — 99214 OFFICE O/P EST MOD 30 MIN: CPT | Mod: S$GLB,,, | Performed by: ALLERGY & IMMUNOLOGY

## 2023-09-14 PROCEDURE — 1159F MED LIST DOCD IN RCRD: CPT | Mod: CPTII,S$GLB,, | Performed by: ALLERGY & IMMUNOLOGY

## 2023-09-14 PROCEDURE — 1160F RVW MEDS BY RX/DR IN RCRD: CPT | Mod: CPTII,S$GLB,, | Performed by: ALLERGY & IMMUNOLOGY

## 2023-09-14 PROCEDURE — 99214 PR OFFICE/OUTPT VISIT, EST, LEVL IV, 30-39 MIN: ICD-10-PCS | Mod: S$GLB,,, | Performed by: ALLERGY & IMMUNOLOGY

## 2023-09-14 PROCEDURE — 1159F PR MEDICATION LIST DOCUMENTED IN MEDICAL RECORD: ICD-10-PCS | Mod: CPTII,S$GLB,, | Performed by: ALLERGY & IMMUNOLOGY

## 2023-09-14 PROCEDURE — 3074F SYST BP LT 130 MM HG: CPT | Mod: CPTII,S$GLB,, | Performed by: ALLERGY & IMMUNOLOGY

## 2023-09-14 PROCEDURE — 3079F DIAST BP 80-89 MM HG: CPT | Mod: CPTII,S$GLB,, | Performed by: ALLERGY & IMMUNOLOGY

## 2023-09-14 PROCEDURE — 3079F PR MOST RECENT DIASTOLIC BLOOD PRESSURE 80-89 MM HG: ICD-10-PCS | Mod: CPTII,S$GLB,, | Performed by: ALLERGY & IMMUNOLOGY

## 2023-09-14 PROCEDURE — 3074F PR MOST RECENT SYSTOLIC BLOOD PRESSURE < 130 MM HG: ICD-10-PCS | Mod: CPTII,S$GLB,, | Performed by: ALLERGY & IMMUNOLOGY

## 2023-09-14 PROCEDURE — 1160F PR REVIEW ALL MEDS BY PRESCRIBER/CLIN PHARMACIST DOCUMENTED: ICD-10-PCS | Mod: CPTII,S$GLB,, | Performed by: ALLERGY & IMMUNOLOGY

## 2023-09-14 PROCEDURE — 3044F PR MOST RECENT HEMOGLOBIN A1C LEVEL <7.0%: ICD-10-PCS | Mod: CPTII,S$GLB,, | Performed by: ALLERGY & IMMUNOLOGY

## 2023-09-14 PROCEDURE — 3044F HG A1C LEVEL LT 7.0%: CPT | Mod: CPTII,S$GLB,, | Performed by: ALLERGY & IMMUNOLOGY

## 2023-09-14 RX ORDER — FLUNISOLIDE 0.25 MG/ML
2 SOLUTION NASAL
COMMUNITY
Start: 2023-05-31 | End: 2023-09-14

## 2023-09-14 RX ORDER — CETIRIZINE HYDROCHLORIDE 10 MG/1
10 TABLET ORAL 2 TIMES DAILY
Qty: 60 TABLET | Refills: 6 | Status: SHIPPED | OUTPATIENT
Start: 2023-09-14 | End: 2024-09-13

## 2023-09-14 RX ORDER — MINERAL OIL
180 ENEMA (ML) RECTAL 2 TIMES DAILY
Qty: 60 TABLET | Refills: 6 | Status: SHIPPED | OUTPATIENT
Start: 2023-09-14 | End: 2024-09-13

## 2023-09-14 RX ORDER — FLUTICASONE PROPIONATE 93 UG/1
93 SPRAY, METERED NASAL 2 TIMES DAILY
Qty: 16 ML | Refills: 3 | Status: SHIPPED | OUTPATIENT
Start: 2023-09-14

## 2023-09-14 NOTE — PROGRESS NOTES
Subjective:       Patient ID: Delfin Mohr is a 53 y.o. male.    Chief Complaint: Chronic urticaria (Patient is doing good. Hasn't been on xhance because insurance denied it. /)    HPI    PT presents for urticaria    Last visit 6/2023    Since his last visit  He is improved as far as urticaria.     His labs were reviewed and negative.     His insurance didn't cover xhance.   He has tried and failed flunisolide, fluticasone. Xhance had a good therapeutic effect, however, now that he is off, his symptoms are affecting quality of life.     March 2023   Started with hives  Cetiriine helps  Jennifer has thyroid disease  No other medical history other tahn pre  dm  A1c 6.2     Rhinitis  Pt's xhance was denied via insurance and now has been off of his medications.   His rhinitis symptoms are worse.     Xhance for nasal polyps  Has had sgy   On slit dust and bc grass   Doesn't feel SLIT is helpful     Component      Latest Ref Rng 6/21/2023   Albumin      3.8 - 4.8 g/dL 4.1    Alpha-1-Globulins      0.2 - 0.3 g/dL 0.3    Alpha-2-Globulins      0.5 - 0.9 g/dL 0.6    Beta Globulin      0.4 - 0.6 g/dL 0.5    Beta-2 Microglobulin      0.2 - 0.5 g/dL 0.4    Gamma Globulin      0.8 - 1.7 g/dL 0.8    Interpretation --    TSH      0.40 - 4.50 mIU/L 1.68    CRP      <8.0 mg/L 2.1    PROTEIN TOTAL      6.1 - 8.1 g/dL 6.6          Review of Systems    General: neg unexpected weight changes, fevers, chills, night sweats, malaise  HEENT: see hpi, Neg eye pain, vision changes, ear drainage, nose bleeds, throat tightness, sores in the mouth  CV: Neg chest pain, palpitations, swelling  Resp: see hpi, neg shortness of breath, hemoptysis, cough  GI: see hpi, neg dysphagia, night abdominal pain, reflux, chronic diarrhea, chronic constipation  Derm: See Hpi, neg new rash, neg flushing  Mu/sk: Neg joint pain, joint swelling   Psych: Neg anxiety  neuro: neg chronic headaches, muscle weakness  Endo: neg heat/cold intolerance, chronic  fatigue    Objective:     Vitals:    09/14/23 1005   BP: 125/85   Pulse: 87   Temp: 98.1 °F (36.7 °C)        Physical Exam    General: no acute distress, well developed well nourished   skin: small urticarial lesions on the right arm  Nose: left nasal polyp vs polypod tissue noted on the left.     Assessment:       1. Nasal polyposis    2. Chronic urticaria          Plan:       Nasal polyposis  -     fluticasone propionate (XHANCE) 93 mcg/actuation AerB; 93 mcg by Nasal route 2 (two) times daily.  Dispense: 16 mL; Refill: 3    Chronic urticaria  -     cetirizine (ZYRTEC) 10 MG tablet; Take 1 tablet (10 mg total) by mouth 2 (two) times a day.  Dispense: 60 tablet; Refill: 6  -     fexofenadine (ALLEGRA) 180 MG tablet; Take 1 tablet (180 mg total) by mouth 2 (two) times a day.  Dispense: 60 tablet; Refill: 6                Chronic urticaria  9/23:  Continue zyrtec qhs, pepcid 1 bid   Allegra q am , drives trucks, try to limit potential sedation.   Labs reviewed.     6/23:  Increase zyrtec   1-2 bid   Pepcid 1 bid prn     Nasal polyps  9/23:  Try to get xhance, nasal polyp appreciated on the left side, middle meatus.   Consider dupixent.   Tried and failed flunisolide, fluticasone  Did well on xhance, but having a hard time getting insurance to approve for fda indication for nasal polyps. Now left polypoid tissue growing on the left s/p fess surgery.     6/23:  Continue xhance  Consider dupixent q 2 weeks     Follow up in 3-6 months, sooner if needed.       Cande Foss M.D.  Allergy/Immunology  [facility]   [phone] phone  [phone] fax

## 2023-09-14 NOTE — LETTER
September 14, 2023        Dinh Urias III, MD  [address]  [address]  [place] [address]             [facility] - Allergy  [address]  [address]  [place] [address]  Phone: [phone]  Fax: [phone]   Patient: Delfin Mohr   MR Number: [id number]   YOB: 1970   Date of Visit: 9/14/2023       Dear Dr. Urias:    Thank you for referring Delfin Mohr to me for evaluation. Below are the relevant portions of my assessment and plan of care.    No diagnosis found.    There are no diagnoses linked to this encounter.        If you have questions, please do not hesitate to call me. I look forward to following Delfin along with you.    Sincerely,      Cande Foss MD           CC  No Recipients

## 2023-09-14 NOTE — PATIENT INSTRUCTIONS
Allegra 2 tablets in the morning     2 zyrtec tablets at night     Nose:  Will try to get xhance approved.     If not, I will change to flovent 1 puff per nostril twice per day or 2 puffs per nostril once per day.

## 2023-10-11 ENCOUNTER — PATIENT MESSAGE (OUTPATIENT)
Dept: FAMILY MEDICINE | Facility: CLINIC | Age: 53
End: 2023-10-11

## 2024-03-05 ENCOUNTER — TELEPHONE (OUTPATIENT)
Dept: FAMILY MEDICINE | Facility: CLINIC | Age: 54
End: 2024-03-05
Payer: COMMERCIAL

## 2024-03-05 DIAGNOSIS — Z12.5 SCREENING PSA (PROSTATE SPECIFIC ANTIGEN): ICD-10-CM

## 2024-03-05 DIAGNOSIS — Z00.00 PHYSICAL EXAM: Primary | ICD-10-CM

## 2024-03-05 DIAGNOSIS — R73.03 PREDIABETES: ICD-10-CM

## 2024-03-05 NOTE — TELEPHONE ENCOUNTER
----- Message from Nima Bermudez sent at 3/5/2024 10:07 AM CST -----  Type: Needs Medical Advice  Who Called:  Patient    Best Call Back Number:  [phone]  Additional Information: Patient states that he would like his labs sent to [facility].  Please call to confirm

## 2024-06-03 ENCOUNTER — TELEPHONE (OUTPATIENT)
Dept: FAMILY MEDICINE | Facility: CLINIC | Age: 54
End: 2024-06-03
Payer: COMMERCIAL

## 2024-06-03 DIAGNOSIS — Z00.00 PHYSICAL EXAM: Primary | ICD-10-CM

## 2024-06-03 NOTE — TELEPHONE ENCOUNTER
----- Message from Arsalan Bolivar sent at 6/3/2024  2:24 PM CDT -----  Type: Needs Medical Advice  Who Called:  pt   Symptoms (please be specific):  said he need the office to please send over some blood [facility] [place]--please call and let him know when it's done he a  and need to schedule before the visit--please call and advise   Best Call Back Number: [phone] (home)     Additional Information: thank you

## 2024-06-15 LAB
ALBUMIN SERPL-MCNC: 4.5 G/DL (ref 3.6–5.1)
ALBUMIN/GLOB SERPL: 1.8 (CALC) (ref 1–2.5)
ALP SERPL-CCNC: 58 U/L (ref 35–144)
ALT SERPL-CCNC: 61 U/L (ref 9–46)
AST SERPL-CCNC: 56 U/L (ref 10–35)
BASOPHILS # BLD AUTO: 20 CELLS/UL (ref 0–200)
BASOPHILS NFR BLD AUTO: 0.4 %
BILIRUB SERPL-MCNC: 0.7 MG/DL (ref 0.2–1.2)
BUN SERPL-MCNC: 15 MG/DL (ref 7–25)
BUN/CREAT SERPL: ABNORMAL (CALC) (ref 6–22)
CALCIUM SERPL-MCNC: 9.8 MG/DL (ref 8.6–10.3)
CHLORIDE SERPL-SCNC: 104 MMOL/L (ref 98–110)
CHOLEST SERPL-MCNC: 139 MG/DL
CHOLEST/HDLC SERPL: 2.6 (CALC)
CO2 SERPL-SCNC: 29 MMOL/L (ref 20–32)
CREAT SERPL-MCNC: 0.92 MG/DL (ref 0.7–1.3)
EGFR: 98 ML/MIN/1.73M2
EOSINOPHIL # BLD AUTO: 71 CELLS/UL (ref 15–500)
EOSINOPHIL NFR BLD AUTO: 1.4 %
ERYTHROCYTE [DISTWIDTH] IN BLOOD BY AUTOMATED COUNT: 14 % (ref 11–15)
GLOBULIN SER CALC-MCNC: 2.5 G/DL (CALC) (ref 1.9–3.7)
GLUCOSE SERPL-MCNC: 105 MG/DL (ref 65–99)
HBA1C MFR BLD: 6.3 % OF TOTAL HGB
HCT VFR BLD AUTO: 41.6 % (ref 38.5–50)
HDLC SERPL-MCNC: 54 MG/DL
HGB BLD-MCNC: 13.3 G/DL (ref 13.2–17.1)
LDLC SERPL CALC-MCNC: 70 MG/DL (CALC)
LYMPHOCYTES # BLD AUTO: 1841 CELLS/UL (ref 850–3900)
LYMPHOCYTES NFR BLD AUTO: 36.1 %
MCH RBC QN AUTO: 24.2 PG (ref 27–33)
MCHC RBC AUTO-ENTMCNC: 32 G/DL (ref 32–36)
MCV RBC AUTO: 75.6 FL (ref 80–100)
MONOCYTES # BLD AUTO: 362 CELLS/UL (ref 200–950)
MONOCYTES NFR BLD AUTO: 7.1 %
NEUTROPHILS # BLD AUTO: 2805 CELLS/UL (ref 1500–7800)
NEUTROPHILS NFR BLD AUTO: 55 %
NONHDLC SERPL-MCNC: 85 MG/DL (CALC)
PLATELET # BLD AUTO: 131 THOUSAND/UL (ref 140–400)
PMV BLD REES-ECKER: 12.8 FL (ref 7.5–12.5)
POTASSIUM SERPL-SCNC: 4.3 MMOL/L (ref 3.5–5.3)
PROT SERPL-MCNC: 7 G/DL (ref 6.1–8.1)
PSA SERPL-MCNC: 0.23 NG/ML
RBC # BLD AUTO: 5.5 MILLION/UL (ref 4.2–5.8)
SODIUM SERPL-SCNC: 141 MMOL/L (ref 135–146)
TRIGL SERPL-MCNC: 73 MG/DL
WBC # BLD AUTO: 5.1 THOUSAND/UL (ref 3.8–10.8)

## 2024-06-17 ENCOUNTER — OFFICE VISIT (OUTPATIENT)
Dept: FAMILY MEDICINE | Facility: CLINIC | Age: 54
End: 2024-06-17
Payer: COMMERCIAL

## 2024-06-17 ENCOUNTER — HOSPITAL ENCOUNTER (OUTPATIENT)
Dept: RADIOLOGY | Facility: HOSPITAL | Age: 54
Discharge: HOME | End: 2024-06-17
Payer: COMMERCIAL

## 2024-06-17 VITALS
WEIGHT: 277.69 LBS | BODY MASS INDEX: 35.64 KG/M2 | DIASTOLIC BLOOD PRESSURE: 82 MMHG | SYSTOLIC BLOOD PRESSURE: 130 MMHG | OXYGEN SATURATION: 95 % | RESPIRATION RATE: 18 BRPM | TEMPERATURE: 98 F | HEART RATE: 78 BPM | HEIGHT: 74 IN

## 2024-06-17 DIAGNOSIS — D50.9 MICROCYTIC ANEMIA: ICD-10-CM

## 2024-06-17 DIAGNOSIS — R06.02 SHORTNESS OF BREATH: ICD-10-CM

## 2024-06-17 DIAGNOSIS — R53.83 FATIGUE, UNSPECIFIED TYPE: ICD-10-CM

## 2024-06-17 DIAGNOSIS — R00.2 PALPITATIONS: ICD-10-CM

## 2024-06-17 DIAGNOSIS — E66.01 SEVERE OBESITY (BMI 35.0-39.9) WITH COMORBIDITY: ICD-10-CM

## 2024-06-17 DIAGNOSIS — I10 ESSENTIAL HYPERTENSION: Primary | ICD-10-CM

## 2024-06-17 PROCEDURE — 99999 PR PBB SHADOW E&M-EST. PATIENT-LVL V: CPT | Mod: PBBFAC,,,

## 2024-06-17 PROCEDURE — 1159F MED LIST DOCD IN RCRD: CPT | Mod: CPTII,S$GLB,,

## 2024-06-17 PROCEDURE — 71046 X-RAY EXAM CHEST 2 VIEWS: CPT | Mod: TC

## 2024-06-17 PROCEDURE — 3008F BODY MASS INDEX DOCD: CPT | Mod: CPTII,S$GLB,,

## 2024-06-17 PROCEDURE — 3075F SYST BP GE 130 - 139MM HG: CPT | Mod: CPTII,S$GLB,,

## 2024-06-17 PROCEDURE — 1160F RVW MEDS BY RX/DR IN RCRD: CPT | Mod: CPTII,S$GLB,,

## 2024-06-17 PROCEDURE — 71046 X-RAY EXAM CHEST 2 VIEWS: CPT | Mod: 26,,, | Performed by: RADIOLOGY

## 2024-06-17 PROCEDURE — 4010F ACE/ARB THERAPY RXD/TAKEN: CPT | Mod: CPTII,S$GLB,,

## 2024-06-17 PROCEDURE — 3044F HG A1C LEVEL LT 7.0%: CPT | Mod: CPTII,S$GLB,,

## 2024-06-17 PROCEDURE — 99214 OFFICE O/P EST MOD 30 MIN: CPT | Mod: S$GLB,,,

## 2024-06-17 PROCEDURE — 3079F DIAST BP 80-89 MM HG: CPT | Mod: CPTII,S$GLB,,

## 2024-06-17 RX ORDER — LOSARTAN POTASSIUM 25 MG/1
25 TABLET ORAL DAILY
Qty: 90 TABLET | Refills: 0 | Status: SHIPPED | OUTPATIENT
Start: 2024-06-17 | End: 2025-06-17

## 2024-06-17 NOTE — PROGRESS NOTES
Subjective:       Patient ID: Delfin Mohr is a 54 y.o. male.    Chief Complaint: Follow-up    Delfin Mohr is a 54-year-old male patient who presents to clinic today for a checkup.  Patient states he recently had blood work done through [facility] which showed a low testosterone.  He does have some fatigue so would like to be tested for this.  Last blood work done his MCV was 75.6.  Also LFTs elevated with AST 56 and ALT 61.  He also states he has had an irregular heartbeat on and off for years.  Has noticed he has some shortness a breath with strenuous activity.  Family history of heart disease with his mother.  He has not sure what kind of heart disease but states she use nitroglycerin at times.  Patient's blood pressure elevated today at 140/92.  He does not have a history of hypertension.  He denies chest pain and is having no palpitations currently.  BMI stable at 35.65.          Review of patient's allergies indicates:  No Known Allergies  Social Determinants of Health     Tobacco Use: Low Risk  (6/17/2024)    Patient History     Smoking Tobacco Use: Never     Smokeless Tobacco Use: Never     Passive Exposure: Not on file   Alcohol Use: Unknown (1/13/2020)    AUDIT-C     Frequency of Alcohol Consumption: Monthly or less     Average Number of Drinks: Not on file     Frequency of Binge Drinking: Not on file   Financial Resource Strain: Not on file   Food Insecurity: Not on file   Transportation Needs: Not on file   Physical Activity: Not on file   Stress: Not on file   Housing Stability: Not on file   Depression: Low Risk  (6/17/2024)    Depression     Last PHQ-4: Flowsheet Data: 0   Utilities: Not on file   Health Literacy: Not on file   Social Isolation: Not on file      No past medical history on file.   No past surgical history on file.   Social History     Socioeconomic History    Marital status: Single         Current Outpatient Medications:     ascorbic acid, vitamin C, (VITAMIN C)  1000 MG tablet, Take 1,000 mg by mouth once daily. (Patient not taking: Reported on 6/17/2024), Disp: , Rfl:     cetirizine (ZYRTEC) 10 MG tablet, Take 1 tablet (10 mg total) by mouth 2 (two) times a day. (Patient not taking: Reported on 6/17/2024), Disp: 60 tablet, Rfl: 6    clobetasol 0.05% (TEMOVATE) 0.05 % Oint, Apply topically 2 (two) times daily. (Patient not taking: Reported on 4/10/2023), Disp: 60 g, Rfl: 2    cyclobenzaprine (FLEXERIL) 10 MG tablet, Take 10 mg by mouth every 8 (eight) hours. (Patient not taking: Reported on 6/17/2024), Disp: , Rfl:     EPINEPHrine (EPIPEN) 0.3 mg/0.3 mL AtIn, Inject into the muscle once. (Patient not taking: Reported on 6/17/2024), Disp: , Rfl:     fexofenadine (ALLEGRA) 180 MG tablet, Take 1 tablet (180 mg total) by mouth 2 (two) times a day. (Patient not taking: Reported on 6/17/2024), Disp: 60 tablet, Rfl: 6    fluticasone propionate (XHANCE) 93 mcg/actuation AerB, 93 mcg by Nasal route 2 (two) times daily. (Patient not taking: Reported on 6/17/2024), Disp: 16 mL, Rfl: 3    ibuprofen (ADVIL,MOTRIN) 800 MG tablet, take 1 tablet by mouth every 6 hours until finished (Patient not taking: Reported on 6/17/2024), Disp: 20 tablet, Rfl: 0    losartan (COZAAR) 25 MG tablet, Take 1 tablet (25 mg total) by mouth once daily., Disp: 90 tablet, Rfl: 0    predniSONE (DELTASONE) 20 MG tablet, Take one tablet po bid x 5 days. Then take one tablet po qd x 5 days (Patient not taking: Reported on 6/20/2023), Disp: 15 tablet, Rfl: 0    Lab Results   Component Value Date    WBC 5.1 06/14/2024    HGB 13.3 06/14/2024    HCT 41.6 06/14/2024     (L) 06/14/2024    CHOL 139 06/14/2024    TRIG 73 06/14/2024    HDL 54 06/14/2024    ALT 61 (H) 06/14/2024    AST 56 (H) 06/14/2024     06/14/2024    K 4.3 06/14/2024     06/14/2024    CREATININE 0.92 06/14/2024    BUN 15 06/14/2024    CO2 29 06/14/2024    TSH 2.673 06/17/2024    HGBA1C 6.3 (H) 06/14/2024       Review of Systems    Constitutional:  Positive for fatigue. Negative for chills, fever and unexpected weight change.   Respiratory:  Positive for shortness of breath. Negative for chest tightness.    Cardiovascular:  Positive for palpitations. Negative for chest pain and leg swelling.   Gastrointestinal: Negative.    Genitourinary: Negative.    Musculoskeletal: Negative.    Neurological: Negative.    Psychiatric/Behavioral: Negative.         Objective:      Physical Exam  Constitutional:       Appearance: Normal appearance.   HENT:      Right Ear: Tympanic membrane normal.      Left Ear: Tympanic membrane normal.      Nose: Nose normal.      Mouth/Throat:      Pharynx: Oropharynx is clear.   Eyes:      Conjunctiva/sclera: Conjunctivae normal.   Neck:      Vascular: No carotid bruit.   Cardiovascular:      Rate and Rhythm: Normal rate and regular rhythm.      Pulses: Normal pulses.      Heart sounds: Normal heart sounds.   Pulmonary:      Effort: Pulmonary effort is normal.      Breath sounds: Normal breath sounds.   Abdominal:      General: Abdomen is flat.      Palpations: Abdomen is soft.   Musculoskeletal:         General: Normal range of motion.   Lymphadenopathy:      Cervical: No cervical adenopathy.   Skin:     General: Skin is warm and dry.      Capillary Refill: Capillary refill takes less than 2 seconds.   Neurological:      General: No focal deficit present.      Mental Status: He is alert.   Psychiatric:         Mood and Affect: Mood normal.         Behavior: Behavior normal.         Thought Content: Thought content normal.         Judgment: Judgment normal.         Assessment:       1. Essential hypertension    2. Palpitations    3. Fatigue, unspecified type    4. Microcytic anemia    5. Shortness of breath    6. Severe obesity (BMI 35.0-39.9) with comorbidity        Plan:       Delfin was seen today for follow-up.    Diagnoses and all orders for this visit:    Essential hypertension  -     losartan (COZAAR) 25 MG tablet;  Take 1 tablet (25 mg total) by mouth once daily.    Palpitations  -     TSH; Future  -     Nuclear Stress - Cardiology Interpreted; Future    Fatigue, unspecified type  -     Iron and TIBC; Future  -     Ferritin; Future  -     Testosterone; Future  -     TSH; Future    Microcytic anemia  -     Iron and TIBC; Future  -     Ferritin; Future    Shortness of breath  -     X-Ray Chest PA And Lateral; Future  -     Nuclear Stress - Cardiology Interpreted; Future    Severe obesity (BMI 35.0-39.9) with comorbidity    Patient does have a history of iron-deficiency anemia.  Check iron and TIBC along with ferritin.  Also check testosterone      Palpitations   - nuclear stress test  - check TSH    Shortness a breath   - chest x-ray  - stress test    Hypertension   - start losartan 25 mg daily  - monitor blood pressures at home and keep daily log.  Bring log to next appointment.  - follow-up in 3-4 weeks    Patient instructed to go to ER if he has any chest pain or palpitations.  Follow-up in clinic in 1 month for blood pressure check.

## 2024-06-18 DIAGNOSIS — R79.89 LOW TESTOSTERONE: Primary | ICD-10-CM

## 2024-07-17 ENCOUNTER — TELEPHONE (OUTPATIENT)
Dept: CARDIOLOGY | Facility: HOSPITAL | Age: 54
End: 2024-07-17

## 2024-07-17 NOTE — TELEPHONE ENCOUNTER
Left message on voicemail.     Patient advised, test will be at [facility] ([address]   Will need to register on the first floor at the main entrance.   Patient advised that arrival time is 9:15am.  Patient advised that he may be here about 3.5-4 hours, and may want to bring something to occupy their time, as there will be periods of waiting.    Patient advised, may take his medications prior to testing if you need to.  Advised if he needs to eat to take his medications, please keep it light, like toast and juice.    Patient advised to avoid all caffeine 12 hours prior to testing.  This includes decaf tea and coffee.    Will provide peanut butter crackers for a snack after stress test.  If patient would prefer something else, please bring a snack from home.    Wear comfortable clothing.   No lotions, oils, or powders to the upper chest area. May wear deodorant.    No metal jewelry, buttons, or zippers to the upper body.  Advised to call the office if any questions.

## 2024-08-09 ENCOUNTER — LAB VISIT (OUTPATIENT)
Dept: LAB | Facility: HOSPITAL | Age: 54
End: 2024-08-09
Payer: COMMERCIAL

## 2024-08-09 ENCOUNTER — OFFICE VISIT (OUTPATIENT)
Dept: FAMILY MEDICINE | Facility: CLINIC | Age: 54
End: 2024-08-09
Payer: COMMERCIAL

## 2024-08-09 VITALS
HEART RATE: 87 BPM | RESPIRATION RATE: 18 BRPM | OXYGEN SATURATION: 96 % | HEIGHT: 74 IN | SYSTOLIC BLOOD PRESSURE: 122 MMHG | WEIGHT: 268.88 LBS | TEMPERATURE: 96 F | BODY MASS INDEX: 34.51 KG/M2 | DIASTOLIC BLOOD PRESSURE: 78 MMHG

## 2024-08-09 DIAGNOSIS — R79.89 ELEVATED LFTS: Primary | ICD-10-CM

## 2024-08-09 DIAGNOSIS — R82.998 DARK URINE: ICD-10-CM

## 2024-08-09 DIAGNOSIS — R79.89 ELEVATED LFTS: ICD-10-CM

## 2024-08-09 DIAGNOSIS — I10 ESSENTIAL HYPERTENSION: Primary | ICD-10-CM

## 2024-08-09 DIAGNOSIS — R73.03 PREDIABETES: ICD-10-CM

## 2024-08-09 LAB
BILIRUB UR QL STRIP: NEGATIVE
CLARITY UR: CLEAR
COLOR UR: YELLOW
GLUCOSE UR QL STRIP: NEGATIVE
HGB UR QL STRIP: NEGATIVE
KETONES UR QL STRIP: NEGATIVE
LEUKOCYTE ESTERASE UR QL STRIP: NEGATIVE
NITRITE UR QL STRIP: NEGATIVE
PH UR STRIP: 6 [PH] (ref 5–8)
PROT UR QL STRIP: ABNORMAL
SP GR UR STRIP: >1.03 (ref 1–1.03)
URN SPEC COLLECT METH UR: ABNORMAL
UROBILINOGEN UR STRIP-ACNC: NEGATIVE EU/DL

## 2024-08-09 PROCEDURE — 81003 URINALYSIS AUTO W/O SCOPE: CPT

## 2024-08-09 PROCEDURE — 99999 PR PBB SHADOW E&M-EST. PATIENT-LVL IV: CPT | Mod: PBBFAC,,,

## 2024-08-09 RX ORDER — LOSARTAN POTASSIUM 25 MG/1
25 TABLET ORAL DAILY
Qty: 90 TABLET | Refills: 1 | Status: SHIPPED | OUTPATIENT
Start: 2024-08-09 | End: 2025-08-09

## 2024-08-19 ENCOUNTER — HOSPITAL ENCOUNTER (OUTPATIENT)
Dept: RADIOLOGY | Facility: HOSPITAL | Age: 54
Discharge: HOME | End: 2024-08-19
Payer: COMMERCIAL

## 2024-08-19 DIAGNOSIS — R79.89 ELEVATED LFTS: ICD-10-CM

## 2024-08-19 PROCEDURE — 76700 US EXAM ABDOM COMPLETE: CPT | Mod: 26,,, | Performed by: RADIOLOGY

## 2024-08-19 PROCEDURE — 76700 US EXAM ABDOM COMPLETE: CPT | Mod: TC,PO

## 2024-08-19 NOTE — PROGRESS NOTES
Ultrasound shows fatty liver.  Need to eat a low fat diet.  Have hepatitis panel done that was ordered.  Avoid alcohol intake .

## 2024-10-21 ENCOUNTER — OCCUPATIONAL HEALTH (OUTPATIENT)
Dept: URGENT CARE | Facility: CLINIC | Age: 54
End: 2024-10-21

## 2024-10-21 PROCEDURE — 80305 DRUG TEST PRSMV DIR OPT OBS: CPT | Mod: S$GLB,,, | Performed by: EMERGENCY MEDICINE

## 2025-02-03 RX ORDER — LOSARTAN POTASSIUM 25 MG/1
25 TABLET ORAL DAILY
Qty: 30 TABLET | Refills: 0 | Status: SHIPPED | OUTPATIENT
Start: 2025-02-03 | End: 2025-02-28

## 2025-02-28 RX ORDER — LOSARTAN POTASSIUM 25 MG/1
25 TABLET ORAL
Qty: 30 TABLET | Refills: 0 | Status: SHIPPED | OUTPATIENT
Start: 2025-02-28

## 2025-03-13 ENCOUNTER — OFFICE VISIT (OUTPATIENT)
Dept: FAMILY MEDICINE | Facility: CLINIC | Age: 55
End: 2025-03-13
Payer: COMMERCIAL

## 2025-03-13 VITALS
WEIGHT: 264 LBS | BODY MASS INDEX: 33.88 KG/M2 | HEART RATE: 68 BPM | TEMPERATURE: 98 F | HEIGHT: 74 IN | OXYGEN SATURATION: 95 % | SYSTOLIC BLOOD PRESSURE: 128 MMHG | DIASTOLIC BLOOD PRESSURE: 86 MMHG | RESPIRATION RATE: 18 BRPM

## 2025-03-13 DIAGNOSIS — R73.03 PREDIABETES: ICD-10-CM

## 2025-03-13 DIAGNOSIS — R79.89 ELEVATED LFTS: ICD-10-CM

## 2025-03-13 DIAGNOSIS — I10 ESSENTIAL HYPERTENSION: Primary | ICD-10-CM

## 2025-03-13 PROCEDURE — 3074F SYST BP LT 130 MM HG: CPT | Mod: CPTII,S$GLB,,

## 2025-03-13 PROCEDURE — 3008F BODY MASS INDEX DOCD: CPT | Mod: CPTII,S$GLB,,

## 2025-03-13 PROCEDURE — 1159F MED LIST DOCD IN RCRD: CPT | Mod: CPTII,S$GLB,,

## 2025-03-13 PROCEDURE — 99214 OFFICE O/P EST MOD 30 MIN: CPT | Mod: S$GLB,,,

## 2025-03-13 PROCEDURE — 1160F RVW MEDS BY RX/DR IN RCRD: CPT | Mod: CPTII,S$GLB,,

## 2025-03-13 PROCEDURE — 3079F DIAST BP 80-89 MM HG: CPT | Mod: CPTII,S$GLB,,

## 2025-03-13 PROCEDURE — 4010F ACE/ARB THERAPY RXD/TAKEN: CPT | Mod: CPTII,S$GLB,,

## 2025-03-13 PROCEDURE — 99999 PR PBB SHADOW E&M-EST. PATIENT-LVL III: CPT | Mod: PBBFAC,,,

## 2025-03-13 RX ORDER — LOSARTAN POTASSIUM 25 MG/1
25 TABLET ORAL DAILY
Qty: 90 TABLET | Refills: 1 | Status: SHIPPED | OUTPATIENT
Start: 2025-03-13

## 2025-03-13 NOTE — PROGRESS NOTES
Subjective:       Patient ID: Delfin Mohr is a 55 y.o. male.    Chief Complaint: Follow-up    Delfin Mohr is a 55-year-old male patient who presents to clinic today for a checkup and medication refill.  History of hypertension on losartan 25 mg daily.  His blood pressure is elevated on arrival at 142/102 which was taken with a medium cuff.  Recheck with large cuff which was more appropriate size for this patient was improved to 128/86.  He has no chest pain or palpitations.  Last visit his LFTs were elevated (AST 74, ALT 79) and he was supposed to recheck his labs but did not go.  Prediabetes with HA1C of 6.3.  Lipids were good with total cholesterol 139 and LDL 70.  PSA normal and up to date.          Review of patient's allergies indicates:  No Known Allergies  Social Drivers of Health     Tobacco Use: Low Risk  (3/13/2025)    Patient History     Smoking Tobacco Use: Never     Smokeless Tobacco Use: Never     Passive Exposure: Not on file   Alcohol Use: Unknown (1/13/2020)    AUDIT-C     Frequency of Alcohol Consumption: Monthly or less     Average Number of Drinks: Not on file     Frequency of Binge Drinking: Not on file   Financial Resource Strain: Not on file   Food Insecurity: Not on file   Transportation Needs: Not on file   Physical Activity: Not on file   Stress: Not on file   Housing Stability: Not on file   Depression: Low Risk  (3/13/2025)    Depression     Last PHQ-4: Flowsheet Data: 0   Utilities: Not on file   Health Literacy: Not on file   Social Isolation: Not on file      No past medical history on file.   No past surgical history on file.   Social History[1]    Current Medications[2]    Lab Results   Component Value Date    WBC 5.1 06/14/2024    HGB 13.3 06/14/2024    HCT 41.6 06/14/2024     (L) 06/14/2024    CHOL 139 06/14/2024    TRIG 73 06/14/2024    HDL 54 06/14/2024    ALT 79 (H) 08/09/2024    AST 74 (H) 08/09/2024     08/09/2024    K 4.7 08/09/2024      08/09/2024    CREATININE 0.9 08/09/2024    BUN 17 08/09/2024    CO2 28 08/09/2024    TSH 2.673 06/17/2024    HGBA1C 6.3 (H) 06/14/2024       Review of Systems   Constitutional: Negative.    Respiratory: Negative.     Cardiovascular: Negative.    Gastrointestinal: Negative.    Neurological: Negative.    Psychiatric/Behavioral: Negative.         Objective:      Physical Exam  Vitals reviewed.   Constitutional:       Appearance: Normal appearance.   Cardiovascular:      Rate and Rhythm: Normal rate and regular rhythm.      Pulses: Normal pulses.      Heart sounds: Normal heart sounds.   Pulmonary:      Effort: Pulmonary effort is normal.      Breath sounds: Normal breath sounds.   Musculoskeletal:         General: Normal range of motion.   Lymphadenopathy:      Cervical: No cervical adenopathy.   Skin:     General: Skin is warm and dry.      Capillary Refill: Capillary refill takes less than 2 seconds.   Neurological:      General: No focal deficit present.      Mental Status: He is alert. Mental status is at baseline.   Psychiatric:         Mood and Affect: Mood normal.         Behavior: Behavior normal.         Thought Content: Thought content normal.         Judgment: Judgment normal.         Assessment:       1. Essential hypertension    2. Prediabetes    3. Elevated LFTs        Plan:       Delfin was seen today for follow-up.    Diagnoses and all orders for this visit:    Essential hypertension  -     CBC Auto Differential; Future  -     Comprehensive Metabolic Panel; Future  -     Hemoglobin A1C; Future  -     CBC Auto Differential  -     Comprehensive Metabolic Panel  -     Hemoglobin A1C  -     losartan (COZAAR) 25 MG tablet; Take 1 tablet (25 mg total) by mouth once daily.    Prediabetes  -     Hemoglobin A1C; Future  -     Hemoglobin A1C    Elevated LFTs  -     Comprehensive Metabolic Panel; Future  -     Comprehensive Metabolic Panel    Hypertension  - controlled  - continue losartan 25 mg daily  - low  sodium diet    Prediabetes  - check HA1C  - low fat, carbohydrate conscious diet.  - increase physical activity and weight loss.      Recheck LFTs.  Paitnet declines vaccines.  Have fasting labs done.  Follow up in 6 months or sooner if needed.             [1]   Social History  Socioeconomic History    Marital status: Single   [2]   Current Outpatient Medications:     losartan (COZAAR) 25 MG tablet, Take 1 tablet (25 mg total) by mouth once daily., Disp: 90 tablet, Rfl: 1

## 2025-03-14 ENCOUNTER — RESULTS FOLLOW-UP (OUTPATIENT)
Dept: FAMILY MEDICINE | Facility: CLINIC | Age: 55
End: 2025-03-14

## 2025-03-14 LAB
ALBUMIN SERPL-MCNC: 4.3 G/DL (ref 3.6–5.1)
ALBUMIN/GLOB SERPL: 1.9 (CALC) (ref 1–2.5)
ALP SERPL-CCNC: 59 U/L (ref 35–144)
ALT SERPL-CCNC: 38 U/L (ref 9–46)
AST SERPL-CCNC: 39 U/L (ref 10–35)
BASOPHILS # BLD AUTO: 19 CELLS/UL (ref 0–200)
BASOPHILS NFR BLD AUTO: 0.4 %
BILIRUB SERPL-MCNC: 0.6 MG/DL (ref 0.2–1.2)
BUN SERPL-MCNC: 14 MG/DL (ref 7–25)
BUN/CREAT SERPL: ABNORMAL (CALC) (ref 6–22)
CALCIUM SERPL-MCNC: 9.3 MG/DL (ref 8.6–10.3)
CHLORIDE SERPL-SCNC: 104 MMOL/L (ref 98–110)
CO2 SERPL-SCNC: 30 MMOL/L (ref 20–32)
CREAT SERPL-MCNC: 0.8 MG/DL (ref 0.7–1.3)
EGFR: 104 ML/MIN/1.73M2
EOSINOPHIL # BLD AUTO: 174 CELLS/UL (ref 15–500)
EOSINOPHIL NFR BLD AUTO: 3.7 %
ERYTHROCYTE [DISTWIDTH] IN BLOOD BY AUTOMATED COUNT: 14.6 % (ref 11–15)
GLOBULIN SER CALC-MCNC: 2.3 G/DL (CALC) (ref 1.9–3.7)
GLUCOSE SERPL-MCNC: 83 MG/DL (ref 65–99)
HBA1C MFR BLD: 6.1 % OF TOTAL HGB
HCT VFR BLD AUTO: 40.6 % (ref 38.5–50)
HGB BLD-MCNC: 12.4 G/DL (ref 13.2–17.1)
LYMPHOCYTES # BLD AUTO: 1889 CELLS/UL (ref 850–3900)
LYMPHOCYTES NFR BLD AUTO: 40.2 %
MCH RBC QN AUTO: 23.7 PG (ref 27–33)
MCHC RBC AUTO-ENTMCNC: 30.5 G/DL (ref 32–36)
MCV RBC AUTO: 77.6 FL (ref 80–100)
MONOCYTES # BLD AUTO: 324 CELLS/UL (ref 200–950)
MONOCYTES NFR BLD AUTO: 6.9 %
NEUTROPHILS # BLD AUTO: 2294 CELLS/UL (ref 1500–7800)
NEUTROPHILS NFR BLD AUTO: 48.8 %
PLATELET # BLD AUTO: 140 THOUSAND/UL (ref 140–400)
PMV BLD REES-ECKER: 12.2 FL (ref 7.5–12.5)
POTASSIUM SERPL-SCNC: 4.3 MMOL/L (ref 3.5–5.3)
PROT SERPL-MCNC: 6.6 G/DL (ref 6.1–8.1)
RBC # BLD AUTO: 5.23 MILLION/UL (ref 4.2–5.8)
SODIUM SERPL-SCNC: 140 MMOL/L (ref 135–146)
WBC # BLD AUTO: 4.7 THOUSAND/UL (ref 3.8–10.8)